# Patient Record
Sex: MALE | Race: WHITE | Employment: FULL TIME | URBAN - METROPOLITAN AREA
[De-identification: names, ages, dates, MRNs, and addresses within clinical notes are randomized per-mention and may not be internally consistent; named-entity substitution may affect disease eponyms.]

---

## 2024-07-11 ENCOUNTER — APPOINTMENT (OUTPATIENT)
Dept: GENERAL RADIOLOGY | Age: 39
End: 2024-07-11
Payer: COMMERCIAL

## 2024-07-11 ENCOUNTER — HOSPITAL ENCOUNTER (OUTPATIENT)
Age: 39
Setting detail: OBSERVATION
Discharge: HOME | End: 2024-07-11
Attending: EMERGENCY MEDICINE | Admitting: FAMILY MEDICINE
Payer: COMMERCIAL

## 2024-07-11 ENCOUNTER — HOSPITAL ENCOUNTER (EMERGENCY)
Dept: ULTRASOUND IMAGING | Age: 39
End: 2024-07-11
Payer: COMMERCIAL

## 2024-07-11 VITALS
DIASTOLIC BLOOD PRESSURE: 87 MMHG | OXYGEN SATURATION: 98 % | RESPIRATION RATE: 13 BRPM | SYSTOLIC BLOOD PRESSURE: 153 MMHG | TEMPERATURE: 98.1 F | HEART RATE: 75 BPM | BODY MASS INDEX: 39.17 KG/M2 | HEIGHT: 75 IN | WEIGHT: 315 LBS

## 2024-07-11 DIAGNOSIS — I47.9 PAROXYSMAL TACHYCARDIA (HCC): ICD-10-CM

## 2024-07-11 DIAGNOSIS — R42 DIZZINESS: Primary | ICD-10-CM

## 2024-07-11 DIAGNOSIS — F41.0 PANIC ATTACK: ICD-10-CM

## 2024-07-11 DIAGNOSIS — E66.01 MORBID OBESITY (HCC): ICD-10-CM

## 2024-07-11 PROBLEM — F41.1 GAD (GENERALIZED ANXIETY DISORDER): Status: ACTIVE | Noted: 2024-07-11

## 2024-07-11 PROBLEM — R00.0 SINUS TACHYCARDIA: Status: ACTIVE | Noted: 2024-07-11

## 2024-07-11 PROBLEM — R00.0 SINUS TACHYCARDIA: Status: RESOLVED | Noted: 2024-07-11 | Resolved: 2024-07-11

## 2024-07-11 LAB
ALBUMIN SERPL-MCNC: 3.4 G/DL (ref 3.5–5)
ALBUMIN/GLOB SERPL: 0.9 (ref 1–1.9)
ALP SERPL-CCNC: 100 U/L (ref 40–129)
ALT SERPL-CCNC: 34 U/L (ref 12–65)
ANION GAP SERPL CALC-SCNC: 13 MMOL/L (ref 9–18)
APPEARANCE UR: CLEAR
AST SERPL-CCNC: 28 U/L (ref 15–37)
BACTERIA URNS QL MICRO: ABNORMAL /HPF
BASOPHILS # BLD: 0.1 K/UL (ref 0–0.2)
BASOPHILS NFR BLD: 1 % (ref 0–2)
BILIRUB SERPL-MCNC: 0.4 MG/DL (ref 0–1.2)
BILIRUB UR QL: NEGATIVE
BUN SERPL-MCNC: 13 MG/DL (ref 6–23)
CALCIUM SERPL-MCNC: 9.2 MG/DL (ref 8.8–10.2)
CHLORIDE SERPL-SCNC: 104 MMOL/L (ref 98–107)
CO2 SERPL-SCNC: 21 MMOL/L (ref 20–28)
COLOR UR: ABNORMAL
CREAT SERPL-MCNC: 1.03 MG/DL (ref 0.8–1.3)
D DIMER PPP FEU-MCNC: 0.46 UG/ML(FEU)
DIFFERENTIAL METHOD BLD: ABNORMAL
EKG ATRIAL RATE: 146 BPM
EKG DIAGNOSIS: NORMAL
EKG P AXIS: 84 DEGREES
EKG P-R INTERVAL: 131 MS
EKG Q-T INTERVAL: 355 MS
EKG QRS DURATION: 100 MS
EKG QTC CALCULATION (BAZETT): 552 MS
EKG R AXIS: 88 DEGREES
EKG T AXIS: 39 DEGREES
EKG VENTRICULAR RATE: 145 BPM
EOSINOPHIL # BLD: 0.4 K/UL (ref 0–0.8)
EOSINOPHIL NFR BLD: 3 % (ref 0.5–7.8)
EPI CELLS #/AREA URNS HPF: ABNORMAL /HPF
ERYTHROCYTE [DISTWIDTH] IN BLOOD BY AUTOMATED COUNT: 13.3 % (ref 11.9–14.6)
GLOBULIN SER CALC-MCNC: 3.6 G/DL (ref 2.3–3.5)
GLUCOSE SERPL-MCNC: 120 MG/DL (ref 70–99)
GLUCOSE UR STRIP.AUTO-MCNC: NEGATIVE MG/DL
HCT VFR BLD AUTO: 44.7 % (ref 41.1–50.3)
HGB BLD-MCNC: 14.8 G/DL (ref 13.6–17.2)
HGB UR QL STRIP: NEGATIVE
HYALINE CASTS URNS QL MICRO: ABNORMAL /LPF
IMM GRANULOCYTES # BLD AUTO: 0.1 K/UL (ref 0–0.5)
IMM GRANULOCYTES NFR BLD AUTO: 1 % (ref 0–5)
KETONES UR QL STRIP.AUTO: NEGATIVE MG/DL
LACTATE SERPL-SCNC: 1.9 MMOL/L (ref 0.5–2)
LEUKOCYTE ESTERASE UR QL STRIP.AUTO: ABNORMAL
LIPASE SERPL-CCNC: 31 U/L (ref 13–60)
LYMPHOCYTES # BLD: 2.6 K/UL (ref 0.5–4.6)
LYMPHOCYTES NFR BLD: 17 % (ref 13–44)
MAGNESIUM SERPL-MCNC: 1.9 MG/DL (ref 1.8–2.4)
MCH RBC QN AUTO: 29.4 PG (ref 26.1–32.9)
MCHC RBC AUTO-ENTMCNC: 33.1 G/DL (ref 31.4–35)
MCV RBC AUTO: 88.7 FL (ref 82–102)
MONOCYTES # BLD: 0.9 K/UL (ref 0.1–1.3)
MONOCYTES NFR BLD: 6 % (ref 4–12)
NEUTS SEG # BLD: 11 K/UL (ref 1.7–8.2)
NEUTS SEG NFR BLD: 73 % (ref 43–78)
NITRITE UR QL STRIP.AUTO: NEGATIVE
NRBC # BLD: 0 K/UL (ref 0–0.2)
PH UR STRIP: 6.5 (ref 5–9)
PLATELET # BLD AUTO: 212 K/UL (ref 150–450)
PMV BLD AUTO: 12.1 FL (ref 9.4–12.3)
POTASSIUM SERPL-SCNC: 4.1 MMOL/L (ref 3.5–5.1)
PROT SERPL-MCNC: 7 G/DL (ref 6.3–8.2)
PROT UR STRIP-MCNC: NEGATIVE MG/DL
RBC # BLD AUTO: 5.04 M/UL (ref 4.23–5.6)
RBC #/AREA URNS HPF: ABNORMAL /HPF
SODIUM SERPL-SCNC: 138 MMOL/L (ref 136–145)
SP GR UR REFRACTOMETRY: 1.02 (ref 1–1.02)
TROPONIN T SERPL HS-MCNC: <6 NG/L (ref 0–22)
TROPONIN T SERPL HS-MCNC: <6 NG/L (ref 0–22)
UROBILINOGEN UR QL STRIP.AUTO: 0.2 EU/DL (ref 0.2–1)
WBC # BLD AUTO: 15 K/UL (ref 4.3–11.1)
WBC URNS QL MICRO: ABNORMAL /HPF

## 2024-07-11 PROCEDURE — 2580000003 HC RX 258: Performed by: FAMILY MEDICINE

## 2024-07-11 PROCEDURE — 96372 THER/PROPH/DIAG INJ SC/IM: CPT

## 2024-07-11 PROCEDURE — 84484 ASSAY OF TROPONIN QUANT: CPT

## 2024-07-11 PROCEDURE — 93005 ELECTROCARDIOGRAM TRACING: CPT | Performed by: EMERGENCY MEDICINE

## 2024-07-11 PROCEDURE — 81001 URINALYSIS AUTO W/SCOPE: CPT

## 2024-07-11 PROCEDURE — G0378 HOSPITAL OBSERVATION PER HR: HCPCS

## 2024-07-11 PROCEDURE — 93010 ELECTROCARDIOGRAM REPORT: CPT | Performed by: INTERNAL MEDICINE

## 2024-07-11 PROCEDURE — 6370000000 HC RX 637 (ALT 250 FOR IP): Performed by: FAMILY MEDICINE

## 2024-07-11 PROCEDURE — 99285 EMERGENCY DEPT VISIT HI MDM: CPT

## 2024-07-11 PROCEDURE — 83735 ASSAY OF MAGNESIUM: CPT

## 2024-07-11 PROCEDURE — 71045 X-RAY EXAM CHEST 1 VIEW: CPT

## 2024-07-11 PROCEDURE — 83605 ASSAY OF LACTIC ACID: CPT

## 2024-07-11 PROCEDURE — 96361 HYDRATE IV INFUSION ADD-ON: CPT

## 2024-07-11 PROCEDURE — 6360000002 HC RX W HCPCS: Performed by: FAMILY MEDICINE

## 2024-07-11 PROCEDURE — 2580000003 HC RX 258: Performed by: EMERGENCY MEDICINE

## 2024-07-11 PROCEDURE — 80053 COMPREHEN METABOLIC PANEL: CPT

## 2024-07-11 PROCEDURE — 83690 ASSAY OF LIPASE: CPT

## 2024-07-11 PROCEDURE — 85025 COMPLETE CBC W/AUTO DIFF WBC: CPT

## 2024-07-11 PROCEDURE — 93970 EXTREMITY STUDY: CPT

## 2024-07-11 PROCEDURE — 93970 EXTREMITY STUDY: CPT | Performed by: RADIOLOGY

## 2024-07-11 PROCEDURE — 96360 HYDRATION IV INFUSION INIT: CPT

## 2024-07-11 PROCEDURE — 85379 FIBRIN DEGRADATION QUANT: CPT

## 2024-07-11 RX ORDER — SODIUM CHLORIDE 0.9 % (FLUSH) 0.9 %
5-40 SYRINGE (ML) INJECTION EVERY 12 HOURS SCHEDULED
Status: DISCONTINUED | OUTPATIENT
Start: 2024-07-11 | End: 2024-07-11 | Stop reason: HOSPADM

## 2024-07-11 RX ORDER — ONDANSETRON 4 MG/1
4 TABLET, ORALLY DISINTEGRATING ORAL EVERY 8 HOURS PRN
Status: DISCONTINUED | OUTPATIENT
Start: 2024-07-11 | End: 2024-07-11 | Stop reason: HOSPADM

## 2024-07-11 RX ORDER — ACETAMINOPHEN 325 MG/1
650 TABLET ORAL EVERY 6 HOURS PRN
Status: DISCONTINUED | OUTPATIENT
Start: 2024-07-11 | End: 2024-07-11 | Stop reason: HOSPADM

## 2024-07-11 RX ORDER — ONDANSETRON 2 MG/ML
4 INJECTION INTRAMUSCULAR; INTRAVENOUS EVERY 6 HOURS PRN
Status: DISCONTINUED | OUTPATIENT
Start: 2024-07-11 | End: 2024-07-11 | Stop reason: HOSPADM

## 2024-07-11 RX ORDER — PANTOPRAZOLE SODIUM 40 MG/1
40 TABLET, DELAYED RELEASE ORAL
Status: DISCONTINUED | OUTPATIENT
Start: 2024-07-11 | End: 2024-07-11

## 2024-07-11 RX ORDER — POLYETHYLENE GLYCOL 3350 17 G/17G
17 POWDER, FOR SOLUTION ORAL DAILY PRN
Status: DISCONTINUED | OUTPATIENT
Start: 2024-07-11 | End: 2024-07-11 | Stop reason: HOSPADM

## 2024-07-11 RX ORDER — SODIUM CHLORIDE 0.9 % (FLUSH) 0.9 %
5-40 SYRINGE (ML) INJECTION PRN
Status: DISCONTINUED | OUTPATIENT
Start: 2024-07-11 | End: 2024-07-11 | Stop reason: HOSPADM

## 2024-07-11 RX ORDER — ESCITALOPRAM OXALATE 10 MG/1
10 TABLET ORAL DAILY
Qty: 30 TABLET | Refills: 2 | Status: SHIPPED | OUTPATIENT
Start: 2024-07-11

## 2024-07-11 RX ORDER — POTASSIUM CHLORIDE 7.45 MG/ML
10 INJECTION INTRAVENOUS PRN
Status: DISCONTINUED | OUTPATIENT
Start: 2024-07-11 | End: 2024-07-11 | Stop reason: HOSPADM

## 2024-07-11 RX ORDER — POTASSIUM CHLORIDE 20 MEQ/1
40 TABLET, EXTENDED RELEASE ORAL PRN
Status: DISCONTINUED | OUTPATIENT
Start: 2024-07-11 | End: 2024-07-11 | Stop reason: HOSPADM

## 2024-07-11 RX ORDER — ENOXAPARIN SODIUM 100 MG/ML
60 INJECTION SUBCUTANEOUS EVERY 12 HOURS
Status: DISCONTINUED | OUTPATIENT
Start: 2024-07-11 | End: 2024-07-11 | Stop reason: HOSPADM

## 2024-07-11 RX ORDER — 0.9 % SODIUM CHLORIDE 0.9 %
1000 INTRAVENOUS SOLUTION INTRAVENOUS ONCE
Status: COMPLETED | OUTPATIENT
Start: 2024-07-11 | End: 2024-07-11

## 2024-07-11 RX ORDER — SODIUM CHLORIDE 9 MG/ML
INJECTION, SOLUTION INTRAVENOUS PRN
Status: DISCONTINUED | OUTPATIENT
Start: 2024-07-11 | End: 2024-07-11 | Stop reason: HOSPADM

## 2024-07-11 RX ORDER — FAMOTIDINE 20 MG/1
20 TABLET, FILM COATED ORAL 2 TIMES DAILY PRN
Qty: 60 TABLET | Refills: 3 | COMMUNITY
Start: 2024-07-11

## 2024-07-11 RX ORDER — SODIUM CHLORIDE 9 MG/ML
INJECTION, SOLUTION INTRAVENOUS CONTINUOUS
Status: DISCONTINUED | OUTPATIENT
Start: 2024-07-11 | End: 2024-07-11

## 2024-07-11 RX ORDER — ACETAMINOPHEN 650 MG/1
650 SUPPOSITORY RECTAL EVERY 6 HOURS PRN
Status: DISCONTINUED | OUTPATIENT
Start: 2024-07-11 | End: 2024-07-11 | Stop reason: HOSPADM

## 2024-07-11 RX ORDER — ALPRAZOLAM 0.5 MG/1
0.5 TABLET ORAL 3 TIMES DAILY PRN
Qty: 15 TABLET | Refills: 0 | Status: SHIPPED | OUTPATIENT
Start: 2024-07-11 | End: 2024-07-16

## 2024-07-11 RX ORDER — MAGNESIUM SULFATE IN WATER 40 MG/ML
2000 INJECTION, SOLUTION INTRAVENOUS PRN
Status: DISCONTINUED | OUTPATIENT
Start: 2024-07-11 | End: 2024-07-11 | Stop reason: HOSPADM

## 2024-07-11 RX ORDER — SODIUM CHLORIDE, SODIUM LACTATE, POTASSIUM CHLORIDE, AND CALCIUM CHLORIDE .6; .31; .03; .02 G/100ML; G/100ML; G/100ML; G/100ML
1000 INJECTION, SOLUTION INTRAVENOUS ONCE
Status: DISCONTINUED | OUTPATIENT
Start: 2024-07-11 | End: 2024-07-11

## 2024-07-11 RX ORDER — FUROSEMIDE 40 MG/1
40 TABLET ORAL 2 TIMES DAILY
Status: DISCONTINUED | OUTPATIENT
Start: 2024-07-11 | End: 2024-07-11 | Stop reason: HOSPADM

## 2024-07-11 RX ORDER — POTASSIUM CHLORIDE 750 MG/1
10 TABLET, EXTENDED RELEASE ORAL DAILY
Status: DISCONTINUED | OUTPATIENT
Start: 2024-07-12 | End: 2024-07-11 | Stop reason: HOSPADM

## 2024-07-11 RX ADMIN — FUROSEMIDE 40 MG: 40 TABLET ORAL at 13:04

## 2024-07-11 RX ADMIN — ENOXAPARIN SODIUM 60 MG: 100 INJECTION SUBCUTANEOUS at 12:25

## 2024-07-11 RX ADMIN — SODIUM CHLORIDE 1000 ML: 9 INJECTION, SOLUTION INTRAVENOUS at 02:35

## 2024-07-11 RX ADMIN — SODIUM CHLORIDE: 9 INJECTION, SOLUTION INTRAVENOUS at 05:28

## 2024-07-11 ASSESSMENT — LIFESTYLE VARIABLES
HOW MANY STANDARD DRINKS CONTAINING ALCOHOL DO YOU HAVE ON A TYPICAL DAY: PATIENT DOES NOT DRINK
HOW OFTEN DO YOU HAVE A DRINK CONTAINING ALCOHOL: NEVER

## 2024-07-11 ASSESSMENT — PAIN DESCRIPTION - LOCATION: LOCATION: ABDOMEN

## 2024-07-11 ASSESSMENT — PAIN SCALES - GENERAL: PAINLEVEL_OUTOF10: 7

## 2024-07-11 NOTE — ED TRIAGE NOTES
Pt presents with mid/upper abdominal pain x 1 hour. Pt states BP has been elevated since last week.    Callie Childress RN

## 2024-07-11 NOTE — ED NOTES
TRANSFER - OUT REPORT:    Verbal report given to JHONNY Moe on Bal Faulkner  being transferred to [facility] for routine progression of patient care       Report consisted of patient's Situation, Background, Assessment and   Recommendations(SBAR).     Information from the following report(s) ED SBAR was reviewed with the receiving nurse.    Lines:   Peripheral IV 07/11/24 Left Antecubital (Active)   Site Assessment Clean, dry & intact 07/11/24 0139   Line Status Blood return noted;Normal saline locked;Capped;Flushed;Specimen collected 07/11/24 0139   Phlebitis Assessment No symptoms 07/11/24 0139   Infiltration Assessment 0 07/11/24 0139   Dressing Status New dressing applied;Clean, dry & intact 07/11/24 0139   Dressing Type Transparent 07/11/24 0139   Dressing Intervention New 07/11/24 0139        Opportunity for questions and clarification was provided.      Patient transported with:  Registered Nurse

## 2024-07-11 NOTE — ED PROVIDER NOTES
Emergency Department Provider Note       PCP: Moncho Gooden MD   Age: 38 y.o.   Sex: male     DISPOSITION Decision To Admit 07/11/2024 05:09:45 AM       ICD-10-CM    1. Dizziness  R42       2. Paroxysmal tachycardia (HCC)  I47.9       3. Morbid obesity (HCC)  E66.01           Medical Decision Making     Pt comes to the ED for evaluation of dizziness, elevated BP reading at home and epigastric/R chest discomfort.  Pt states he was in his bed when he began to feel dizzy, states he then took his BP and noticed it was high.  Pt reports discomfort in epigastric and R chest region without radiation to his back.  When asked about dyspnea, pt states he felt SOB en route to the ED, however, he attributes this to anxiety.  Currently denies SOB.  In ED, pt's BP improved without intervention.  No increased WOB or hypoxia.  EKG with sinus tachycardia.      0230: Called to bedside, pt with episode of feeling 'flushed' and heart palpitations.   and regular.  EKG with sinus tachycardia.  Vagal maneuvers done at bedside, heart rate down to the 1 teens/120s.  Patient reports similar episodes previously.  Patient's weight prevents imaging at this facility.  Given lower BP, will give 1L NS and continue to observe on telemetry.  No hypoxia present.     CBC with WBC of 15.  Stable H/H.  CMP unremarkable.  Mg wnl.  Lactic Acid wnl.  Lipase wnl.  UA with WBCs and epithelial cells.  Repeat troponin wnl.  Messaged hospitalist regarding pt's ED course.      Hospitalist asked for D-dimer and dopplers.  D-dimer negative.  Spoke with rad-tech, dopplers of LE appear negative as well.  Hospitalist messaged, will evaluate pt.  Pt updated on plan for evaluation.           1 acute, uncomplicated illness or injury.    Over the counter drug management performed.  Prescription drug management performed.  Shared medical decision making was utilized in creating the patients health plan today.    I independently ordered and reviewed each unique

## 2024-07-11 NOTE — H&P
[facility] Initial History and Physical Note    Patient: Bal Faulkner Date: 7/11/2024  male, 38 y.o.  Admit Date: 7/11/2024  Attending: Giovanni Abbott MD     ASSESSMENT AND PLAN:     Principal Problem:    Sinus tachycardia  Plan: Uncertain etiology. Improved with IVF. Continue IVF, follow on remote tele. Trend troponins.          DVT Prophylaxis: Lovenox      Code Status: FULL CODE      Disposition: Observe on remote tele for evaluation and treatment as per above.      Anticipated discharge: < 2 midnights     CHIEF COMPLAINT:  chest pain    HISTORY OF PRESENT ILLNESS:      Patient Active Problem List   Diagnosis    Sinus tachycardia       Bal Faulkner is a 38 y.o. male, with a history of  has a past medical history of Gastroesophageal reflux disease with esophagitis without hemorrhage.,  has a past surgical history that includes Cholecystectomy (N/A). who presents to the ER with report of indigestion and epigastric pain that started around 11 PM last night. After it began it started to radiate to his right chest wall and left shoulder. He began to feel dizzy and panicky. He reports that the chest pain has resolved. Noted that his BP at home was 180s over 100s. Denies any nausea, vomiting, diarrhea.    Allergy  Allergies   Allergen Reactions    Pantoprazole Hives and Itching     Pins and needles sensation         Medication list  Not in a hospital admission.     Past Medical History   Past Medical History:   Diagnosis Date    Gastroesophageal reflux disease with esophagitis without hemorrhage        Past Surgical History:   Procedure Laterality Date    CHOLECYSTECTOMY N/A        Social History   Social History     Socioeconomic History    Marital status:      Spouse name: None    Number of children: None    Years of education: None    Highest education level: None   Tobacco Use    Smoking status: Never    Smokeless tobacco: Never   Substance and Sexual Activity    Drug

## 2024-07-11 NOTE — PROGRESS NOTES
Critical Care Interdisciplinary Rounds with staff.     Mary Kay Baez M.Div, [facility] / / Bereavement Coordinator  [facility] [place] Care Department   c: [phone]/ [phone] / [email or web address]  [email or web address]

## 2024-07-11 NOTE — PLAN OF CARE
Problem: Discharge Planning  Goal: Discharge to home or other facility with appropriate resources  7/11/2024 1632 by Devang Gregorio RN  Outcome: Adequate for Discharge  7/11/2024 0927 by Donna Dudley RN  Outcome: Progressing  Flowsheets (Taken 7/11/2024 0845)  Discharge to home or other facility with appropriate resources: Identify barriers to discharge with patient and caregiver     Problem: Pain  Goal: Verbalizes/displays adequate comfort level or baseline comfort level  7/11/2024 1632 by Devang Gregorio RN  Outcome: Adequate for Discharge  7/11/2024 0927 by Donna Dudley RN  Outcome: Progressing  Flowsheets (Taken 7/11/2024 0900)  Verbalizes/displays adequate comfort level or baseline comfort level: Encourage patient to monitor pain and request assistance     Problem: Safety - Adult  Goal: Free from fall injury  7/11/2024 1632 by Devang Gregorio RN  Outcome: Adequate for Discharge  Flowsheets (Taken 7/11/2024 1018 by Donna Dudley RN)  Free From Fall Injury: Instruct family/caregiver on patient safety  7/11/2024 0927 by Donna Dudley RN  Outcome: Progressing     Problem: Skin/Tissue Integrity  Goal: Absence of new skin breakdown  Description: 1.  Monitor for areas of redness and/or skin breakdown  2.  Assess vascular access sites hourly  3.  Every 4-6 hours minimum:  Change oxygen saturation probe site  4.  Every 4-6 hours:  If on nasal continuous positive airway pressure, respiratory therapy assess nares and determine need for appliance change or resting period.  7/11/2024 1632 by Devang Gregorio RN  Outcome: Adequate for Discharge  7/11/2024 0927 by Donna Dudley RN  Outcome: Adequate for Discharge     Problem: Chronic Conditions and Co-morbidities  Goal: Patient's chronic conditions and co-morbidity symptoms are monitored and maintained or improved  7/11/2024 1632 by Devang Gregorio RN  Outcome: Adequate for Discharge  7/11/2024 0927 by Donna Dudley RN  Outcome: Not

## 2024-07-11 NOTE — PLAN OF CARE
Patient has a BMI of 65 and p laced on a bariatric bed    Problem: Chronic Conditions and Co-morbidities  Goal: Patient's chronic conditions and co-morbidity symptoms are monitored and maintained or improved  Outcome: Not Progressing     Problem: Discharge Planning  Goal: Discharge to home or other facility with appropriate resources  Outcome: Progressing     Problem: Pain  Goal: Verbalizes/displays adequate comfort level or baseline comfort level  Outcome: Progressing     Problem: Safety - Adult  Goal: Free from fall injury  Outcome: Progressing     Problem: Skin/Tissue Integrity  Goal: Absence of new skin breakdown  Description: 1.  Monitor for areas of redness and/or skin breakdown  2.  Assess vascular access sites hourly  3.  Every 4-6 hours minimum:  Change oxygen saturation probe site  4.  Every 4-6 hours:  If on nasal continuous positive airway pressure, respiratory therapy assess nares and determine need for appliance change or resting period.  Outcome: Adequate for Discharge     Problem: Chronic Conditions and Co-morbidities  Goal: Patient's chronic conditions and co-morbidity symptoms are monitored and maintained or improved  Outcome: Not Progressing

## 2024-07-11 NOTE — CARE COORDINATION
Pt is for discharge home today with family and no needs/supportive care orders recieved for MSW at this time.         07/11/24 1513   Service Assessment   Patient's Healthcare Decision Maker is: Patient Declined (Legal Next of Kin Remains as Decision Maker)   Social/Functional History   Lives With Spouse;Family   Type of Home House   Home Layout One level   Home Access Stairs to enter without rails   Entrance Stairs - Number of Steps 2   Receives Help From Family   ADL Assistance Independent   Homemaking Assistance Independent   Homemaking Responsibilities Yes   Ambulation Assistance Independent   Transfer Assistance Independent   Active  Yes   Mode of Transportation Car   Occupation Full time employment   Type of Occupation [employer]   Services At/After Discharge   Transition of Care Consult (CM Consult) Discharge Planning   Services At/After Discharge None    Resource Information Provided? No   Mode of Transport at Discharge Other (see comment)  (spouse)   Confirm Follow Up Transport Self   Condition of Participation: Discharge Planning   The Plan for Transition of Care is related to the following treatment goals: Patient to return home with family.   The Patient and/or Patient Representative was provided with a Choice of Provider? Patient   The Patient and/Or Patient Representative agree with the Discharge Plan? Yes   Freedom of Choice list was provided with basic dialogue that supports the patient's individualized plan of care/goals, treatment preferences, and shares the quality data associated with the providers?  Yes     Shyla Myers, MEAGAN, [facility] [place]    [facility]

## 2024-07-11 NOTE — DISCHARGE SUMMARY
0532 -- 81 14 -- --   07/11/24 0531 -- 81 13 -- --   07/11/24 0530 -- 83 18 -- --   07/11/24 0529 -- 86 13 -- --   07/11/24 0528 -- 83 13 -- --   07/11/24 0527 -- 81 16 -- --   07/11/24 0526 -- 83 20 -- --   07/11/24 0525 -- 83 26 -- --   07/11/24 0524 -- 83 11 -- --   07/11/24 0523 -- 84 16 -- --   07/11/24 0522 -- 83 11 -- --   07/11/24 0521 -- 82 13 -- --   07/11/24 0520 -- 89 15 -- --   07/11/24 0519 -- 85 12 -- --   07/11/24 0518 -- 84 11 -- --   07/11/24 0517 -- 88 24 -- --   07/11/24 0516 -- 89 12 -- --   07/11/24 0515 -- 85 10 139/61 --   07/11/24 0514 -- 85 16 -- --   07/11/24 0513 -- 86 19 -- --   07/11/24 0512 -- 90 21 -- --   07/11/24 0511 -- 92 (!) 7 130/79 98 %   07/11/24 0300 -- 90 12 130/85 100 %   07/11/24 0245 -- (!) 107 14 (!) 148/97 99 %   07/11/24 0230 -- (!) 119 12 (!) 71/50 99 %   07/11/24 0215 -- 97 14 90/62 97 %   07/11/24 0200 -- 98 13 97/64 97 %   07/11/24 0145 -- 86 15 (!) 102/57 96 %   07/11/24 0045 97.7 °F (36.5 °C) (!) 115 16 (!) 183/101 98 %       Oxygen Therapy  SpO2: 97 %  Pulse via Oximetry: 76 beats per minute  O2 Device: None (Room air)  Skin Assessment: Clean, dry, & intact    Estimated body mass index is 66 kg/m² as calculated from the following:    Height as of this encounter: 1.905 m (6' 3\").    Weight as of this encounter: 239.5 kg (528 lb).    Intake/Output Summary (Last 24 hours) at 7/11/2024 1453  Last data filed at 7/11/2024 0306  Gross per 24 hour   Intake 1000.01 ml   Output --   Net 1000.01 ml         Physical Exam:    General:    Well nourished.  No overt distress  Head:  Normocephalic, atraumatic  Eyes:  Sclerae appear normal.  Pupils equally round.    CV:   RRR.   No JVD  Lungs:   Respirations even, unlabored  Abdomen:   Soft, nontender, nondistended.    Extremities: Warm and dry.   No edema.    Skin:     No rashes.  Normal coloration  Neuro:  CN II-XII grossly intact.  Psych:  Normal mood and affect.      Signed:  Giovanni Abbott MD    Part of this note may

## 2024-07-11 NOTE — CARE COORDINATION
SW CM met with pt, spouse, and brother-in-law at bedside for initial CM assessment. Pt admitted observation status for sinus tachycardia. Pt alert and oriented x 4. Demographics, insurance, and PCP discussed and verified. Last PCP visit 12/11/2023 with Dr. Moncho Gooden. Pt lives with spouse and mother-in-law in one level home with 2 steps to enter. Pt states independent with ADLs, an active  in the community, and full-time employee PTA. Pt reports DME of CPAP at home provided by [employer]. No additional DME or HH in the home. Pt reports supportive local family and friends. Spouse plans to provide transportation at discharge.     Pt will need MD note for employment at discharge.     DC/POC to return home with spouse when medically stable.     CM team to continue to follow for any needs that may arise.        07/11/24 0900   Service Assessment   Patient Orientation Alert and Oriented;Person;Place;Situation;Self   Cognition Alert   History Provided By Patient   Primary Caregiver Self   Accompanied By/Relationship spouse Whitney and brother in law   Support Systems Family Members;Spouse/Significant Other;Friends/Neighbors   Patient's Healthcare Decision Maker is: Patient Declined (Legal Next of Kin Remains as Decision Maker)   PCP Verified by CM Yes   Last Visit to PCP Within last year   Prior Functional Level Independent in ADLs/IADLs   Current Functional Level Independent in ADLs/IADLs   Can patient return to prior living arrangement Yes   Ability to make needs known: Good   Family able to assist with home care needs: Yes   Would you like for me to discuss the discharge plan with any other family members/significant others, and if so, who? Yes  (spouse Whitney)   Financial Resources Other (Comment)  (BCBS)   Community Resources None   CM/SW Referral Other (see comment)  (assessment)   Social/Functional History   Lives With Spouse;Family   Type of Home House   Home Layout One level   Home Access Stairs to enter

## 2024-07-28 ENCOUNTER — HOSPITAL ENCOUNTER (EMERGENCY)
Age: 39
Discharge: HOME | End: 2024-07-28
Attending: EMERGENCY MEDICINE
Payer: COMMERCIAL

## 2024-07-28 ENCOUNTER — APPOINTMENT (OUTPATIENT)
Dept: GENERAL RADIOLOGY | Age: 39
End: 2024-07-28
Payer: COMMERCIAL

## 2024-07-28 VITALS
WEIGHT: 315 LBS | SYSTOLIC BLOOD PRESSURE: 140 MMHG | OXYGEN SATURATION: 94 % | HEART RATE: 80 BPM | BODY MASS INDEX: 39.17 KG/M2 | RESPIRATION RATE: 14 BRPM | HEIGHT: 75 IN | TEMPERATURE: 98.2 F | DIASTOLIC BLOOD PRESSURE: 77 MMHG

## 2024-07-28 DIAGNOSIS — R07.9 CHEST PAIN, UNSPECIFIED TYPE: Primary | ICD-10-CM

## 2024-07-28 LAB
ALBUMIN SERPL-MCNC: 3.1 G/DL (ref 3.5–5)
ALBUMIN/GLOB SERPL: 1 (ref 1–1.9)
ALP SERPL-CCNC: 94 U/L (ref 40–129)
ALT SERPL-CCNC: 32 U/L (ref 12–65)
ANION GAP SERPL CALC-SCNC: 13 MMOL/L (ref 9–18)
AST SERPL-CCNC: 31 U/L (ref 15–37)
BASOPHILS # BLD: 0.1 K/UL (ref 0–0.2)
BASOPHILS NFR BLD: 1 % (ref 0–2)
BILIRUB SERPL-MCNC: 0.4 MG/DL (ref 0–1.2)
BUN SERPL-MCNC: 9 MG/DL (ref 6–23)
CALCIUM SERPL-MCNC: 8.4 MG/DL (ref 8.8–10.2)
CHLORIDE SERPL-SCNC: 103 MMOL/L (ref 98–107)
CO2 SERPL-SCNC: 24 MMOL/L (ref 20–28)
CREAT SERPL-MCNC: 1 MG/DL (ref 0.8–1.3)
DIFFERENTIAL METHOD BLD: ABNORMAL
EKG ATRIAL RATE: 74 BPM
EKG DIAGNOSIS: NORMAL
EKG P AXIS: 14 DEGREES
EKG P-R INTERVAL: 203 MS
EKG Q-T INTERVAL: 386 MS
EKG QRS DURATION: 87 MS
EKG QTC CALCULATION (BAZETT): 429 MS
EKG R AXIS: 29 DEGREES
EKG T AXIS: 29 DEGREES
EKG VENTRICULAR RATE: 74 BPM
EOSINOPHIL # BLD: 0.5 K/UL (ref 0–0.8)
EOSINOPHIL NFR BLD: 4 % (ref 0.5–7.8)
ERYTHROCYTE [DISTWIDTH] IN BLOOD BY AUTOMATED COUNT: 13.5 % (ref 11.9–14.6)
GLOBULIN SER CALC-MCNC: 3.2 G/DL (ref 2.3–3.5)
GLUCOSE SERPL-MCNC: 114 MG/DL (ref 70–99)
HCT VFR BLD AUTO: 41.9 % (ref 41.1–50.3)
HGB BLD-MCNC: 13.9 G/DL (ref 13.6–17.2)
IMM GRANULOCYTES # BLD AUTO: 0.1 K/UL (ref 0–0.5)
IMM GRANULOCYTES NFR BLD AUTO: 0 % (ref 0–5)
LIPASE SERPL-CCNC: 41 U/L (ref 13–60)
LYMPHOCYTES # BLD: 3.5 K/UL (ref 0.5–4.6)
LYMPHOCYTES NFR BLD: 30 % (ref 13–44)
MCH RBC QN AUTO: 29.9 PG (ref 26.1–32.9)
MCHC RBC AUTO-ENTMCNC: 33.2 G/DL (ref 31.4–35)
MCV RBC AUTO: 90.1 FL (ref 82–102)
MONOCYTES # BLD: 0.6 K/UL (ref 0.1–1.3)
MONOCYTES NFR BLD: 5 % (ref 4–12)
NEUTS SEG # BLD: 6.8 K/UL (ref 1.7–8.2)
NEUTS SEG NFR BLD: 59 % (ref 43–78)
NRBC # BLD: 0 K/UL (ref 0–0.2)
PLATELET # BLD AUTO: 193 K/UL (ref 150–450)
PMV BLD AUTO: 12.4 FL (ref 9.4–12.3)
POTASSIUM SERPL-SCNC: 4.1 MMOL/L (ref 3.5–5.1)
PROT SERPL-MCNC: 6.4 G/DL (ref 6.3–8.2)
RBC # BLD AUTO: 4.65 M/UL (ref 4.23–5.6)
SODIUM SERPL-SCNC: 140 MMOL/L (ref 136–145)
TROPONIN T SERPL HS-MCNC: 7 NG/L (ref 0–22)
TROPONIN T SERPL HS-MCNC: <6 NG/L (ref 0–22)
WBC # BLD AUTO: 11.5 K/UL (ref 4.3–11.1)

## 2024-07-28 PROCEDURE — 84484 ASSAY OF TROPONIN QUANT: CPT

## 2024-07-28 PROCEDURE — 83690 ASSAY OF LIPASE: CPT

## 2024-07-28 PROCEDURE — 93010 ELECTROCARDIOGRAM REPORT: CPT | Performed by: INTERNAL MEDICINE

## 2024-07-28 PROCEDURE — 80053 COMPREHEN METABOLIC PANEL: CPT

## 2024-07-28 PROCEDURE — 93005 ELECTROCARDIOGRAM TRACING: CPT | Performed by: EMERGENCY MEDICINE

## 2024-07-28 PROCEDURE — 99285 EMERGENCY DEPT VISIT HI MDM: CPT

## 2024-07-28 PROCEDURE — 36415 COLL VENOUS BLD VENIPUNCTURE: CPT

## 2024-07-28 PROCEDURE — 71045 X-RAY EXAM CHEST 1 VIEW: CPT

## 2024-07-28 PROCEDURE — 85025 COMPLETE CBC W/AUTO DIFF WBC: CPT

## 2024-07-28 NOTE — ED PROVIDER NOTES
Emergency Department Provider Note                   PCP:                Moncho Gooden MD               Age: 38 y.o.      Sex: male       ICD-10-CM    1. Chest pain, unspecified type  R07.9           DISPOSITION Decision To Discharge 07/28/2024 09:03:40 AM        MDM  Number of Diagnoses or Management Options  Chest pain, unspecified type  Diagnosis management comments: MEDICAL DECISION MAKING  Complexity of Problems Addressed:  1 or more acute illnesses that pose a threat to life or bodily function.     Data Reviewed and Analyzed:  Category 1:   I independently reviewed each unique test.    Category 3: Discussion of management or test interpretation.  The patient was signed over with results of studies pending.  Patient's lab work and x-ray were all reassuring.  The patient had no further episodes of chest pain while under observation.  I discussed the results with the patient and he has close follow-up.  He does have an echocardiogram that is pending at this time.  At the time of disposition, the patient was in agreement with discharge home.      9:03 AM  The patient has been resting comfortably.  I discussed results and recommend close follow-up.  The patient is agreement this plan.    Risk of Complications and/or Morbidity of Patient Management:  Shared medical decision making was utilized in creating the patients health plan today.                Orders Placed This Encounter   Procedures    XR CHEST PORTABLE    CBC with Auto Differential    Comprehensive Metabolic Panel w/ Reflex to MG    Troponin now then q90 min for 2 occurances    Lipase    Cardiac Monitor - ED Only “IF” in treatment area.    EKG 12 Lead    Saline lock IV “IF” in treatment area.        Medications - No data to display    New Prescriptions    No medications on file        Bal Faulkner is a 38 y.o. male who presents to the Emergency Department with chief complaint of    Chief Complaint   Patient presents with    Anxiety      HPI

## 2024-07-28 NOTE — ED TRIAGE NOTES
Pt presents to ED with c/o anxiety and chest discomfort. Pt states he took a nitro but doesn't know if it helped.

## 2024-07-28 NOTE — ED PROVIDER NOTES
Emergency Department Provider Note       PCP: Moncho Gooden MD   Age: 38 y.o.   Sex: male     DISPOSITION       No diagnosis found.    Medical Decision Making     6:53 AM EDT The patient's care will be transitioned to Dr Lund.  At this time, the plan is for follow-up labs and repeat troponin.  Please see that provider's documentation for further information.         1 or more acute illnesses that pose a threat to life or bodily function.   Shared medical decision making was utilized in creating the patients health plan today.    I independently ordered and reviewed each unique test.  I reviewed external records: ED visit note from an outside group.  I reviewed external records: provider visit note from PCP.  I reviewed external records: provider visit note from outside specialist.  I reviewed external records: previous EKG including cardiologist interpretation.    I reviewed external records: previous lab results from outside ED.  I reviewed external records: previous imaging study including radiologist interpretation.   The patients assessment required an independent historian: EMs.  The reason they were needed is important historical information not provided by the patient.  I interpreted the X-rays limited by body habitus, no acute cardiopulmonary abnormality, awaiting final radiologist interpretation.  My Independent EKG Interpretation: sinus rhythm, no evidence of arrhythmia      ST Segments:Nonspecific ST segments - NO STEMI   Rate: 74            History     38-year-old male with a history of anxiety and GERD presents with sudden onset central chest pressure that woke him up from sleep about 45 minutes ago.  Also reports shortness of breath and sweating.  He took a Xanax and nitroglycerin at the same time and symptoms have mostly resolved.  He was told he had esophageal spasms in the past which is why he was prescribed nitroglycerin.  He denies having a prior cardiac catheterization.  He has had a stress